# Patient Record
Sex: MALE | Race: WHITE | HISPANIC OR LATINO | Employment: FULL TIME | URBAN - METROPOLITAN AREA
[De-identification: names, ages, dates, MRNs, and addresses within clinical notes are randomized per-mention and may not be internally consistent; named-entity substitution may affect disease eponyms.]

---
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
## 2021-11-24 ENCOUNTER — APPOINTMENT (OUTPATIENT)
Dept: RADIOLOGY | Facility: MEDICAL CENTER | Age: 21
End: 2021-11-24
Attending: EMERGENCY MEDICINE
Payer: MEDICAID

## 2021-11-24 ENCOUNTER — HOSPITAL ENCOUNTER (EMERGENCY)
Facility: MEDICAL CENTER | Age: 21
End: 2021-11-24
Attending: EMERGENCY MEDICINE
Payer: MEDICAID

## 2021-11-24 VITALS
RESPIRATION RATE: 18 BRPM | WEIGHT: 140 LBS | HEIGHT: 65 IN | OXYGEN SATURATION: 95 % | BODY MASS INDEX: 23.32 KG/M2 | DIASTOLIC BLOOD PRESSURE: 93 MMHG | TEMPERATURE: 98.2 F | HEART RATE: 97 BPM | SYSTOLIC BLOOD PRESSURE: 132 MMHG

## 2021-11-24 DIAGNOSIS — S06.0X9A CONCUSSION WITH LOSS OF CONSCIOUSNESS, INITIAL ENCOUNTER: ICD-10-CM

## 2021-11-24 DIAGNOSIS — S20.211A CONTUSION OF RIGHT CHEST WALL, INITIAL ENCOUNTER: ICD-10-CM
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
## 2021-11-24 LAB
ABO GROUP BLD: NORMAL
ALBUMIN SERPL BCP-MCNC: 5.5 G/DL (ref 3.2–4.9)
ALBUMIN/GLOB SERPL: 1.9 G/DL
ALP SERPL-CCNC: 83 U/L (ref 30–99)
ALT SERPL-CCNC: 19 U/L (ref 2–50)
AMPHET UR QL SCN: NEGATIVE
ANION GAP SERPL CALC-SCNC: 14 MMOL/L (ref 7–16)
APTT PPP: 31.5 SEC (ref 24.7–36)
AST SERPL-CCNC: 29 U/L (ref 12–45)
BARBITURATES UR QL SCN: NEGATIVE
BENZODIAZ UR QL SCN: NEGATIVE
BILIRUB SERPL-MCNC: 0.7 MG/DL (ref 0.1–1.5)
BLD GP AB SCN SERPL QL: NORMAL
BUN SERPL-MCNC: 10 MG/DL (ref 8–22)
BZE UR QL SCN: POSITIVE
CALCIUM SERPL-MCNC: 9.6 MG/DL (ref 8.5–10.5)
CANNABINOIDS UR QL SCN: POSITIVE
CHLORIDE SERPL-SCNC: 99 MMOL/L (ref 96–112)
CO2 SERPL-SCNC: 23 MMOL/L (ref 20–33)
CREAT SERPL-MCNC: 0.86 MG/DL (ref 0.5–1.4)
ERYTHROCYTE [DISTWIDTH] IN BLOOD BY AUTOMATED COUNT: 39 FL (ref 35.9–50)
ETHANOL BLD-MCNC: 186.8 MG/DL (ref 0–10)
GLOBULIN SER CALC-MCNC: 2.9 G/DL (ref 1.9–3.5)
GLUCOSE SERPL-MCNC: 110 MG/DL (ref 65–99)
HCT VFR BLD AUTO: 48 % (ref 42–52)
HGB BLD-MCNC: 16 G/DL (ref 14–18)
INR PPP: 1.19 (ref 0.87–1.13)
MCH RBC QN AUTO: 28.2 PG (ref 27–33)
MCHC RBC AUTO-ENTMCNC: 33.3 G/DL (ref 33.7–35.3)
MCV RBC AUTO: 84.5 FL (ref 81.4–97.8)
METHADONE UR QL SCN: NEGATIVE
OPIATES UR QL SCN: NEGATIVE
OXYCODONE UR QL SCN: NEGATIVE
PCP UR QL SCN: NEGATIVE
PLATELET # BLD AUTO: 201 K/UL (ref 164–446)
PMV BLD AUTO: 10.2 FL (ref 9–12.9)
POTASSIUM SERPL-SCNC: 3.1 MMOL/L (ref 3.6–5.5)
PROPOXYPH UR QL SCN: NEGATIVE
PROT SERPL-MCNC: 8.4 G/DL (ref 6–8.2)
PROTHROMBIN TIME: 14.8 SEC (ref 12–14.6)
RBC # BLD AUTO: 5.68 M/UL (ref 4.7–6.1)
RH BLD: NORMAL
SODIUM SERPL-SCNC: 136 MMOL/L (ref 135–145)
WBC # BLD AUTO: 7.7 K/UL (ref 4.8–10.8)

## 2021-11-24 PROCEDURE — 86850 RBC ANTIBODY SCREEN: CPT

## 2021-11-24 PROCEDURE — 82077 ASSAY SPEC XCP UR&BREATH IA: CPT

## 2021-11-24 PROCEDURE — 90715 TDAP VACCINE 7 YRS/> IM: CPT | Performed by: EMERGENCY MEDICINE

## 2021-11-24 PROCEDURE — 99284 EMERGENCY DEPT VISIT MOD MDM: CPT

## 2021-11-24 PROCEDURE — 85610 PROTHROMBIN TIME: CPT

## 2021-11-24 PROCEDURE — 85027 COMPLETE CBC AUTOMATED: CPT

## 2021-11-24 PROCEDURE — 86900 BLOOD TYPING SEROLOGIC ABO: CPT

## 2021-11-24 PROCEDURE — 80053 COMPREHEN METABOLIC PANEL: CPT

## 2021-11-24 PROCEDURE — 303353 HCHG DERMABOND SKIN ADHESIVE

## 2021-11-24 PROCEDURE — 90471 IMMUNIZATION ADMIN: CPT

## 2021-11-24 PROCEDURE — 70450 CT HEAD/BRAIN W/O DYE: CPT

## 2021-11-24 PROCEDURE — 86901 BLOOD TYPING SEROLOGIC RH(D): CPT

## 2021-11-24 PROCEDURE — 85730 THROMBOPLASTIN TIME PARTIAL: CPT

## 2021-11-24 PROCEDURE — 72125 CT NECK SPINE W/O DYE: CPT

## 2021-11-24 PROCEDURE — 305948 HCHG GREEN TRAUMA ACT PRE-NOTIFY NO CC

## 2021-11-24 PROCEDURE — 304217 HCHG IRRIGATION SYSTEM

## 2021-11-24 PROCEDURE — 700111 HCHG RX REV CODE 636 W/ 250 OVERRIDE (IP): Performed by: EMERGENCY MEDICINE

## 2021-11-24 PROCEDURE — 71045 X-RAY EXAM CHEST 1 VIEW: CPT

## 2021-11-24 PROCEDURE — 80307 DRUG TEST PRSMV CHEM ANLYZR: CPT

## 2021-11-24 PROCEDURE — 304999 HCHG REPAIR-SIMPLE/INTERMED LEVEL 1

## 2021-11-24 RX ORDER — KETOROLAC TROMETHAMINE 10 MG/1
10 TABLET, FILM COATED ORAL EVERY 4 HOURS PRN
Qty: 20 TABLET | Refills: 0 | Status: SHIPPED | OUTPATIENT
Start: 2021-11-24

## 2021-11-24 RX ORDER — CYCLOBENZAPRINE HCL 10 MG
10 TABLET ORAL 3 TIMES DAILY PRN
Qty: 30 TABLET | Refills: 0 | Status: SHIPPED | OUTPATIENT
Start: 2021-11-24

## 2021-11-24 RX ADMIN — CLOSTRIDIUM TETANI TOXOID ANTIGEN (FORMALDEHYDE INACTIVATED), CORYNEBACTERIUM DIPHTHERIAE TOXOID ANTIGEN (FORMALDEHYDE INACTIVATED), BORDETELLA PERTUSSIS TOXOID ANTIGEN (GLUTARALDEHYDE INACTIVATED), BORDETELLA PERTUSSIS FILAMENTOUS HEMAGGLUTININ ANTIGEN (FORMALDEHYDE INACTIVATED), BORDETELLA PERTUSSIS PERTACTIN ANTIGEN, AND BORDETELLA PERTUSSIS FIMBRIAE 2/3 ANTIGEN 0.5 ML: 5; 2; 2.5; 5; 3; 5 INJECTION, SUSPENSION INTRAMUSCULAR at 14:54

## 2021-11-24 NOTE — Clinical Note
Basia Lundberg was seen and treated in our emergency department on 11/20/2021.  He may return to work on 11/27/2021.       If you have any questions or concerns, please don't hesitate to call.      Nadir Cardoso M.D.
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
## 2021-11-25 NOTE — DISCHARGE PLANNING
[facility]   Records given to Sriram CLEMENTE with [facility].

## 2021-11-25 NOTE — ED NOTES
IV d/c'd cath intact; no redness, no swelling noted; drsg applied.  D/C home with written and verbal instructions re: Rx, activity, f/u.  Verbalizes understanding.  Ambulated out to family

## 2021-11-26 NOTE — DISCHARGE PLANNING
Pt's first name is spelled wrong (Basia) and he states does not have an 'H'    PAR notified to change.

## 2021-11-26 NOTE — DISCHARGE PLANNING
Call from pt's wife (Saman [phone]) stating the work note from Dr Cardoso states he was here on Nov 20th when, in fact, it was Nov 24th.     When reviewing Epic under 'ED Arrival Information expected date is 11/20/2021 14:27 with arrival time 11/24/2021 13:58'. Discussed with Raul (charge nurse) who states what likely happened is pt came in a Trauma Green and trama chart was probably created on 11/20. There is no way to go back to modify it as it should have been changed when chart was activated.     VM left for Ananadia to discuss. New note written to return to work Dec 1st.       Saman called back with  on phone to verify email address on file and will send secure e-mail to him.    He was told any further work notes will need to come from PCP or he would need to be seen again.

## 2021-11-29 ENCOUNTER — TELEPHONE (OUTPATIENT)
Dept: SCHEDULING | Facility: IMAGING CENTER | Age: 21
End: 2021-11-29

## 2021-12-02 ENCOUNTER — OFFICE VISIT (OUTPATIENT)
Dept: MEDICAL GROUP | Facility: CLINIC | Age: 21
End: 2021-12-02
Payer: MEDICAID

## 2021-12-02 VITALS
DIASTOLIC BLOOD PRESSURE: 85 MMHG | WEIGHT: 118.4 LBS | HEIGHT: 65 IN | SYSTOLIC BLOOD PRESSURE: 128 MMHG | RESPIRATION RATE: 16 BRPM | HEART RATE: 72 BPM | BODY MASS INDEX: 19.73 KG/M2 | OXYGEN SATURATION: 97 % | TEMPERATURE: 98.6 F

## 2021-12-02 DIAGNOSIS — F33.0 MILD EPISODE OF RECURRENT MAJOR DEPRESSIVE DISORDER (HCC): ICD-10-CM
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
## 2021-12-02 DIAGNOSIS — F12.90 MARIJUANA USE: ICD-10-CM

## 2021-12-02 PROCEDURE — 99203 OFFICE O/P NEW LOW 30 MIN: CPT | Performed by: FAMILY MEDICINE

## 2021-12-02 RX ORDER — AMOXICILLIN 500 MG/1
CAPSULE ORAL
COMMUNITY
Start: 2021-09-30

## 2021-12-02 ASSESSMENT — PATIENT HEALTH QUESTIONNAIRE - PHQ9
5. POOR APPETITE OR OVEREATING: 2 - MORE THAN HALF THE DAYS
SUM OF ALL RESPONSES TO PHQ QUESTIONS 1-9: 17
CLINICAL INTERPRETATION OF PHQ2 SCORE: 5

## 2021-12-02 ASSESSMENT — FIBROSIS 4 INDEX: FIB4 SCORE: 0.66

## 2021-12-02 NOTE — PATIENT INSTRUCTIONS
"[facility]: Yohana Maier DO  Address: [address] [place] [address]  Phone: [phone]    Google \"[employer] [facility]\" for free therapy through [employer]  "
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
## 2021-12-07 NOTE — ASSESSMENT & PLAN NOTE
Per chart review, pt tox screen was positive for cannabinoids and cocaine as well as elevated serum alcohol level of 186.  Pt states awareness of these results.  Discussed importance of avoiding driving under the influence of these substances.

## 2021-12-07 NOTE — ASSESSMENT & PLAN NOTE
Patient willing to consider therapy, provided behavioral health resource list and specifically recommended [facility] for local therapy and also discussed that [employer] provides free vidhya-based therapy for [facility] residents.  Pt declines antidepressant therapy today.  He states that his mood has been helped by marijuana use, and he is interested in a medical marijuana card.  He was informed that this practice does not provide these cards, and referred to a local provider who does and can determine if he is eligible for a medical marijuana card.

## 2023-07-05 ENCOUNTER — HOSPITAL ENCOUNTER (EMERGENCY)
Facility: MEDICAL CENTER | Age: 23
End: 2023-07-05
Attending: EMERGENCY MEDICINE
Payer: MEDICAID

## 2023-07-05 ENCOUNTER — APPOINTMENT (OUTPATIENT)
Dept: RADIOLOGY | Facility: MEDICAL CENTER | Age: 23
End: 2023-07-05
Attending: EMERGENCY MEDICINE
Payer: MEDICAID

## 2023-07-05 VITALS
SYSTOLIC BLOOD PRESSURE: 128 MMHG | OXYGEN SATURATION: 100 % | TEMPERATURE: 97.2 F | HEART RATE: 51 BPM | WEIGHT: 121.25 LBS | RESPIRATION RATE: 15 BRPM | DIASTOLIC BLOOD PRESSURE: 74 MMHG | BODY MASS INDEX: 20.2 KG/M2 | HEIGHT: 65 IN

## 2023-07-05 DIAGNOSIS — S61.211A LACERATION OF LEFT INDEX FINGER WITHOUT FOREIGN BODY WITHOUT DAMAGE TO NAIL, INITIAL ENCOUNTER: ICD-10-CM

## 2023-07-05 PROCEDURE — 90715 TDAP VACCINE 7 YRS/> IM: CPT | Performed by: EMERGENCY MEDICINE

## 2023-07-05 PROCEDURE — 700111 HCHG RX REV CODE 636 W/ 250 OVERRIDE (IP): Performed by: EMERGENCY MEDICINE

## 2023-07-05 PROCEDURE — 304217 HCHG IRRIGATION SYSTEM

## 2023-07-05 PROCEDURE — 73140 X-RAY EXAM OF FINGER(S): CPT | Mod: LT

## 2023-07-05 PROCEDURE — 90471 IMMUNIZATION ADMIN: CPT

## 2023-07-05 PROCEDURE — 99283 EMERGENCY DEPT VISIT LOW MDM: CPT

## 2023-07-05 RX ORDER — CEPHALEXIN 500 MG/1
500 CAPSULE ORAL 4 TIMES DAILY
Qty: 20 CAPSULE | Refills: 0 | Status: ACTIVE | OUTPATIENT
Start: 2023-07-05 | End: 2023-07-10

## 2023-07-05 RX ADMIN — CLOSTRIDIUM TETANI TOXOID ANTIGEN (FORMALDEHYDE INACTIVATED), CORYNEBACTERIUM DIPHTHERIAE TOXOID ANTIGEN (FORMALDEHYDE INACTIVATED), BORDETELLA PERTUSSIS TOXOID ANTIGEN (GLUTARALDEHYDE INACTIVATED), BORDETELLA PERTUSSIS FILAMENTOUS HEMAGGLUTININ ANTIGEN (FORMALDEHYDE INACTIVATED), BORDETELLA PERTUSSIS PERTACTIN ANTIGEN, AND BORDETELLA PERTUSSIS FIMBRIAE 2/3 ANTIGEN 0.5 ML: 5; 2; 2.5; 5; 3; 5 INJECTION, SUSPENSION INTRAMUSCULAR at 13:44

## 2023-07-05 ASSESSMENT — FIBROSIS 4 INDEX: FIB4 SCORE: 0.73

## 2023-07-05 NOTE — ED TRIAGE NOTES
Pt amb to triage.  Chief Complaint   Patient presents with    T-5000    Hand Laceration     Left pointer finger vs. glass     Unk last tdap.

## 2023-07-05 NOTE — DISCHARGE INSTRUCTIONS
Keep wound clean, dry and covered.    Return to the ER for any redness, swelling, drainage of pus from the wound, red streaks of the finger, increased pain, fevers over 100.4, shaking chills, or for any concerns.    Soak your finger in Epsom salt several times a day.  This will help with swelling and pain.    Follow-up with Dr. Vigil, orthopedic surgeon, within the next 1 to 2 days.  Please call for appointment.

## 2023-07-05 NOTE — ED PROVIDER NOTES
ER Provider Note    Scribed for Dr. Casie Terrell M.D. by Pako Padilla. 7/5/2023 1:02 PM    Primary Care Provider: Serina Jolly M.D.    CHIEF COMPLAINT  Chief Complaint   Patient presents with    T-5000    Hand Laceration     Left pointer finger vs. glass     EXTERNAL RECORDS REVIEWED  Records were reviewed which showed that the patient was seen by their PCP, EDI Pacheco, in December of 2022 and was seen by Urgent Care in May 2023 for Covid testing.     HPI/ROS  LIMITATION TO HISTORY   None  OUTSIDE HISTORIAN(S):  None    Ken Lundberg is a 23 y.o. male who presents to the ED complaining of a laceration on their left side of his left pointer finger yesterday night at 7 PM. He reports that he was trying to open a glass soda bottle yesterday using a lighter but the top broke into small pieces and cut his finger. He states he removed a shard of glass from the wound that had stabbed into it. He denies any numbness or tingling to the finger. No alleviating factors reported. He is unsure when his last tetanus shot was.     PAST MEDICAL HISTORY  No pertinent past medical history noted.     SURGICAL HISTORY  Past Surgical History:   Procedure Laterality Date    APPENDECTOMY LAPAROSCOPIC  5/25/2015    Procedure: APPENDECTOMY LAPAROSCOPIC;  Surgeon: Nelly Jolly M.D.;  Location: SURGERY [facility];  Service:      FAMILY HISTORY  No pertinent family history noted.     SOCIAL HISTORY   reports that he has never smoked. He has never used smokeless tobacco. He reports current alcohol use. He reports that he does not use drugs.    CURRENT MEDICATIONS  Previous Medications    ACETAMINOPHEN-CODEINE #3 (TYLENOL #3) 300-30 MG TAB        AMOXICILLIN (AMOXIL) 500 MG CAP        CYCLOBENZAPRINE (FLEXERIL) 10 MG TAB    Take 1 Tablet by mouth 3 times a day as needed.    KETOROLAC (TORADOL) 10 MG TAB    Take 1 Tablet by mouth every four hours as needed.     ALLERGIES  Patient has no known allergies.    PHYSICAL EXAM  /88  "  Pulse 61   Temp 36.2 °C (97.1 °F) (Temporal)   Resp 16   Ht 1.651 m (5' 5\")   Wt 55 kg (121 lb 4.1 oz)   SpO2 99%        Constitutional: Alert in no apparent distress.  HENT: Normocephalic, Bilateral external ears normal. Nose normal.   Eyes: Pupils are equal and reactive. Conjunctiva normal, non-icteric.   Thorax & Lungs: Easy unlabored respirations  Skin: Visualized skin is  Dry, No erythema, No rash.   Extremities: 1 cm linear laceration running parallel to the finger along the ulnar aspect of the palmar side, of the distal phalanx, of the left second digit, which appears to already be healing. No signs of redness or purulence. Able to flex and extend at DIP, PIP, and MCP joint without difficulty. Good strength against resistance at all joints. Sensation intact to light touch. Good cap refill.   Neurologic: Alert, clear speech  Psychiatric: Affect and Mood normal    DIAGNOSTIC STUDIES    Radiology:   The attending emergency physician has independently interpreted the diagnostic imaging associated with this visit and am waiting the final reading from the radiologist.     Preliminary independent interpretation: ER MD is reviewed the patient's x-ray.  No obvious foreign body.      Radiologist interpretation:   DX-FINGER(S) 2+ LEFT   Final Result      No radiographic evidence of acute traumatic injury or radiodense foreign body.          COURSE & MEDICAL DECISION MAKING     ED Observation Status? No; Patient does not meet criteria for ED Observation.     INITIAL ASSESSMENT AND PLAN    Care Narrative: Patient presents to the ER with complaint of a finger laceration to the left second digit.  He said he was trying to open a glass soda bottle yesterday with a lighter and the top of the bottle broke.  A piece of glass went into his finger.  He said he pulled out the piece of glass.  He thinks the glass measured about 1-1/2 cm by half a centimeter.  He believes he pulled out the entire piece of glass in its " entirety.  X-ray was obtained and there is no residual glass seen on x-ray.  Patient sustained the wound at 7 PM last night.  At this time its almost 24 hours old and is too old to sew up.  Will need to heal by secondary intention, especially since is a high risk of infection given the puncture like quality of the injury.  Patient will be placed on 5 days of Keflex.  He has good tendon function.  No evidence of tendon laceration or compromise.  He is neurovascular intact.  At this time no signs of infection.  He will be referred to the [facility]/hand surgeon on-call today.  Patient's tetanus was updated.  He has been given instructions on wound care.  He has been given strict return precautions and discharge instructions and understands treatment plan and follow-up.    1:02 PM - Patient was seen and evaluated at bedside. I discussed concerns for remaining glass with the patient as well as informed the patient that since their wound has been open for longer than 8 hours it would need to remain open to heal. Patient was given an opportunity to ask questions. Patient will be treated with Adacel injection. Patient verbalizes understanding and agreement to this plan of care.     ADDITIONAL PROBLEM LIST AND DISPOSITION    Problem #1: Left index finger laceration    I have discussed management of the patient with the following physicians and CM's: none    Discussion of management with other QHP or appropriate source(s): None     Escalation of care considered, and ultimately not performed: blood analysis.  No signs of infection and therefore no blood work needed.    Barriers to care at this time, including but not limited to:  None known .     Decision tools and prescription drugs considered including, but not limited to: Antibiotics patient will go home with 5 days of Keflex as prophylactic antibiotics for a puncture wound which has been open for nearly 24 hours. .    The patient will return for new or  worsening symptoms and is stable at the time of discharge.    The patient is referred to a primary physician for blood pressure management, diabetic screening, and for all other preventative health concerns.    DISPOSITION:  Patient will be discharged home in stable condition.    FOLLOW UP:  Lalo Vigil M.D.  [address] [place] [address]  [phone]    Schedule an appointment as soon as possible for a visit in 2 days  If symptoms worsen return to ER    OUTPATIENT MEDICATIONS:  New Prescriptions    CEPHALEXIN (KEFLEX) 500 MG CAP    Take 1 Capsule by mouth 4 times a day for 5 days.     FINAL DIAGNOSIS  1. Laceration of left index finger without foreign body without damage to nail, initial encounter Acute     The note accurately reflects work and decisions made by me.  Casie Terrell M.D.  7/5/2023  1:47 PM    This dictation has been created using voice recognition software. The accuracy of the dictation is limited by the abilities of the software. I expect there may be some errors of grammar and possibly content. I made every attempt to manually correct the errors within my dictation. However, errors related to voice recognition software may still exist and should be interpreted within the appropriate context.     Pako CANCHOLA (Lisa), am scribing for, and in the presence of, Casie Terrell M.D..    Electronically signed by: Pako Padilla (Lisa), 7/5/2023    Casie CANCHOLA M.D. personally performed the services described in this documentation, as scribed by Pako Padilla in my presence, and it is both accurate and complete.

## 2024-02-01 ENCOUNTER — OFFICE VISIT (OUTPATIENT)
Dept: URGENT CARE | Facility: CLINIC | Age: 24
End: 2024-02-01
Payer: MEDICAID

## 2024-02-01 VITALS
DIASTOLIC BLOOD PRESSURE: 74 MMHG | RESPIRATION RATE: 18 BRPM | BODY MASS INDEX: 22.98 KG/M2 | HEART RATE: 77 BPM | OXYGEN SATURATION: 98 % | TEMPERATURE: 98 F | HEIGHT: 65 IN | SYSTOLIC BLOOD PRESSURE: 118 MMHG | WEIGHT: 137.9 LBS

## 2024-02-01 DIAGNOSIS — R51.9 NONINTRACTABLE HEADACHE, UNSPECIFIED CHRONICITY PATTERN, UNSPECIFIED HEADACHE TYPE: ICD-10-CM

## 2024-02-01 DIAGNOSIS — R11.0 NAUSEA: ICD-10-CM

## 2024-02-01 PROCEDURE — 3074F SYST BP LT 130 MM HG: CPT | Performed by: PHYSICIAN ASSISTANT

## 2024-02-01 PROCEDURE — 3078F DIAST BP <80 MM HG: CPT | Performed by: PHYSICIAN ASSISTANT

## 2024-02-01 PROCEDURE — 99203 OFFICE O/P NEW LOW 30 MIN: CPT | Performed by: PHYSICIAN ASSISTANT

## 2024-02-01 RX ORDER — ONDANSETRON 4 MG/1
4 TABLET, FILM COATED ORAL EVERY 6 HOURS PRN
Qty: 20 TABLET | Refills: 1 | Status: SHIPPED | OUTPATIENT
Start: 2024-02-01

## 2024-02-01 ASSESSMENT — ENCOUNTER SYMPTOMS
DIARRHEA: 0
ABDOMINAL PAIN: 0
VOMITING: 0
CHILLS: 0
FEVER: 0
SORE THROAT: 0
COUGH: 0
NAUSEA: 0

## 2024-02-01 NOTE — PROGRESS NOTES
Subjective:     Ken Lundberg  is a 23 y.o. male who presents for Headache (X1days Nausea/pt needs note)      Other  This is a new problem. The current episode started yesterday. Pertinent negatives include no abdominal pain, chills, coughing, fever, nausea (resolved), sore throat or vomiting. Headaches: resolved.  Patient notes mild symptoms of nausea and headache yesterday at work while at work.  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  He states significant improvement upon leaving last night.  Denies fevers or chills.  Denies cough or sore throat.  Denies ear pain.  Noted mild nausea but did not have an episode of vomiting.  Denied abdominal pain last night or any diarrhea.  Notes complete resolution of symptoms this morning and employer does request a work excuse note.    Review of Systems   Constitutional:  Negative for chills and fever.   HENT:  Negative for ear pain and sore throat.    Respiratory:  Negative for cough.    Gastrointestinal:  Negative for abdominal pain, diarrhea, nausea (resolved) and vomiting.   Neurological:  Headaches: resolved.       Medications:    acetaminophen-codeine #3 Tabs  amoxicillin Caps  cyclobenzaprine Tabs  ketorolac Tabs    Allergies: Patient has no known allergies.    Problem List: Ken Lundberg does not have any pertinent problems on file.    Surgical History:  Past Surgical History:   Procedure Laterality Date    APPENDECTOMY LAPAROSCOPIC  5/25/2015    Procedure: APPENDECTOMY LAPAROSCOPIC;  Surgeon: Nelly Jolly M.D.;  Location: SURGERY [facility];  Service:        Past Social Hx: Ken Lundberg  reports that he has never smoked. He has never used smokeless tobacco. He reports current alcohol use. He reports that he does not use drugs.     Past Family Hx:  Ken Lundberg family history is not on file.     Problem list, medications, and allergies reviewed by myself today in Epic.      "Objective:   /74 (BP Location: Left arm, Patient Position: Sitting)   Pulse 77   Temp 36.7 °C (98 °F) (Temporal)   Resp 18   Ht 1.651 m (5' 5\")   Wt 62.6 kg (137 lb 14.4 oz)   SpO2 98%   BMI 22.95 kg/m²     Physical Exam  Vitals and nursing note reviewed.   Constitutional:       General: He is not in acute distress.     Appearance: Normal appearance. He is well-developed. He is not diaphoretic.   HENT:      Head: Normocephalic and atraumatic.      Right Ear: Tympanic membrane, ear canal and external ear normal.      Left Ear: Tympanic membrane, ear canal and external ear normal.      Nose: Nose normal.      Mouth/Throat:      Mouth: Mucous membranes are moist.      Pharynx: Uvula midline. Posterior oropharyngeal erythema ( mild PND) present. No oropharyngeal exudate.      Tonsils: No tonsillar abscesses.   Eyes:      General: No scleral icterus.        Right eye: No discharge.         Left eye: No discharge.      Conjunctiva/sclera: Conjunctivae normal.   Pulmonary:      Effort: Pulmonary effort is normal. No respiratory distress.      Breath sounds: Normal breath sounds. No stridor. No decreased breath sounds, wheezing, rhonchi or rales.   Musculoskeletal:         General: Normal range of motion.      Cervical back: Neck supple.   Skin:     General: Skin is warm and dry.      Coloration: Skin is not pale.   Neurological:      Mental Status: He is alert and oriented to person, place, and time.      Coordination: Coordination normal.         Assessment/Plan:   Assessment      1. Nonintractable headache, unspecified chronicity pattern, unspecified headache type    2. Nausea  - ondansetron (ZOFRAN) 4 MG Tab tablet; Take 1 Tablet by mouth every 6 hours as needed for Nausea/Vomiting.  Dispense: 20 Tablet; Refill: 1  Supportive care is reviewed with patient/caregiver - recommend to push PO fluids and electrolytes, sent with work excuse note, supportive care reviewed  Return to clinic with lack of resolution " or progression of symptoms.      I have worn an N95 mask, gloves and eye protection for the entire encounter with this patient.     Differential diagnosis, natural history, supportive care, and indications for immediate follow-up discussed.

## 2024-02-01 NOTE — LETTER
MARKIE  [facility] [place] [id number]  [place] [address]     February 1, 2024    Patient: Ken Lundberg   YOB: 2000   Date of Visit: 2/1/2024       To Whom It May Concern:    Ken Lundberg was seen and treated in our department on 2/1/2024.  He should be excused from missed work for yesterday and today.    Sincerely,     Jose Mccann P.A.-C.

## 2024-03-01 ENCOUNTER — APPOINTMENT (OUTPATIENT)
Dept: URGENT CARE | Facility: CLINIC | Age: 24
End: 2024-03-01

## 2024-03-01 ENCOUNTER — OFFICE VISIT (OUTPATIENT)
Dept: URGENT CARE | Facility: CLINIC | Age: 24
End: 2024-03-01
Payer: MEDICAID

## 2024-03-01 VITALS
SYSTOLIC BLOOD PRESSURE: 118 MMHG | HEART RATE: 78 BPM | OXYGEN SATURATION: 97 % | RESPIRATION RATE: 16 BRPM | WEIGHT: 136.6 LBS | HEIGHT: 65 IN | DIASTOLIC BLOOD PRESSURE: 60 MMHG | BODY MASS INDEX: 22.76 KG/M2 | TEMPERATURE: 97.5 F

## 2024-03-01 DIAGNOSIS — J02.9 SORE THROAT: ICD-10-CM

## 2024-03-01 DIAGNOSIS — J00 ACUTE NASOPHARYNGITIS (COMMON COLD): ICD-10-CM

## 2024-03-01 LAB — S PYO DNA SPEC NAA+PROBE: NOT DETECTED

## 2024-03-01 PROCEDURE — 3078F DIAST BP <80 MM HG: CPT | Performed by: NURSE PRACTITIONER

## 2024-03-01 PROCEDURE — 3074F SYST BP LT 130 MM HG: CPT | Performed by: NURSE PRACTITIONER

## 2024-03-01 PROCEDURE — 99213 OFFICE O/P EST LOW 20 MIN: CPT | Performed by: NURSE PRACTITIONER

## 2024-03-01 PROCEDURE — 87651 STREP A DNA AMP PROBE: CPT | Performed by: NURSE PRACTITIONER

## 2024-03-01 ASSESSMENT — ENCOUNTER SYMPTOMS
ORTHOPNEA: 0
COUGH: 1
SHORTNESS OF BREATH: 0
WHEEZING: 0
HEADACHES: 1
DIARRHEA: 0
FEVER: 0
CHILLS: 0
NAUSEA: 0
EYE DISCHARGE: 0
SORE THROAT: 1
MYALGIAS: 1
SPUTUM PRODUCTION: 1

## 2024-03-01 NOTE — LETTER
March 1, 2024        Ken Toño  [address] [place] [address]        Ken was seen in our clinic today and he is excused from work for Wednesday, yesterday and today.  If you have any questions or concerns, please don't hesitate to call.        Sincerely,        TAM Burton.    Electronically Signed

## 2024-03-01 NOTE — PROGRESS NOTES
Subjective     Ken Lundberg is a 24 y.o. male who presents with Cough (X 4 days sore throat/ fever/ vomiting/ needs note for work )            HPI  New problem.  Patient is a 24-year-old male who presents with a 4-day history of nasal congestion, cough, sore throat, nausea, and diarrhea.  He also endorses fever and chills.  He has not had a flu shot this year.  He has been taking over-the-counter combination cough and cold for his symptoms.  He is in need of a work note.    Patient has no known allergies.  Current Outpatient Medications on File Prior to Visit   Medication Sig Dispense Refill    ondansetron (ZOFRAN) 4 MG Tab tablet Take 1 Tablet by mouth every 6 hours as needed for Nausea/Vomiting. (Patient not taking: Reported on 3/1/2024) 20 Tablet 1    amoxicillin (AMOXIL) 500 MG Cap  (Patient not taking: Reported on 12/2/2021)      acetaminophen-codeine #3 (TYLENOL #3) 300-30 MG Tab  (Patient not taking: Reported on 12/2/2021)      ketorolac (TORADOL) 10 MG Tab Take 1 Tablet by mouth every four hours as needed. (Patient not taking: Reported on 12/2/2021) 20 Tablet 0    cyclobenzaprine (FLEXERIL) 10 mg Tab Take 1 Tablet by mouth 3 times a day as needed. (Patient not taking: Reported on 12/2/2021) 30 Tablet 0     No current facility-administered medications on file prior to visit.     Social History     Socioeconomic History    Marital status: Single     Spouse name: Not on file    Number of children: Not on file    Years of education: Not on file    Highest education level: Not on file   Occupational History    Not on file   Tobacco Use    Smoking status: Never    Smokeless tobacco: Never   Vaping Use    Vaping Use: Never used   Substance and Sexual Activity    Alcohol use: Yes     Comment: occ    Drug use: No    Sexual activity: Not on file   Other Topics Concern    Behavioral problems Not Asked    Interpersonal relationships Not Asked    Sad or not enjoying activities Not Asked    Suicidal thoughts Not Asked  "   Poor school performance Not Asked    Reading difficulties Not Asked    Speech difficulties Not Asked    Writing difficulties Not Asked    Inadequate sleep Not Asked    Excessive TV viewing Not Asked    Excessive video game use Not Asked    Inadequate exercise Not Asked    Sports related Not Asked    Poor diet Not Asked    Family concerns for drug/alcohol abuse Not Asked    Poor oral hygiene Not Asked    Bike safety Not Asked    Family concerns vehicle safety Not Asked   Social History Narrative    Not on file     Social Determinants of Health     Financial Resource Strain: Not on file   Food Insecurity: Not on file   Transportation Needs: Not on file   Physical Activity: Not on file   Stress: Not on file   Social Connections: Not on file   Intimate Partner Violence: Not on file   Housing Stability: Not on file     Breast Cancer-related family history is not on file.      Review of Systems   Constitutional:  Positive for malaise/fatigue. Negative for chills and fever.   HENT:  Positive for congestion and sore throat.    Eyes:  Negative for discharge.   Respiratory:  Positive for cough and sputum production. Negative for shortness of breath and wheezing.    Cardiovascular:  Negative for chest pain and orthopnea.   Gastrointestinal:  Negative for diarrhea and nausea.   Musculoskeletal:  Positive for myalgias.   Neurological:  Positive for headaches.   Endo/Heme/Allergies:  Negative for environmental allergies.   All other systems reviewed and are negative.             Objective     /60 (BP Location: Right arm, Patient Position: Sitting, BP Cuff Size: Adult)   Pulse 78   Temp 36.4 °C (97.5 °F) (Temporal)   Resp 16   Ht 1.651 m (5' 5\")   Wt 62 kg (136 lb 9.6 oz)   SpO2 97%   BMI 22.73 kg/m²      Physical Exam  Vitals and nursing note reviewed.   Constitutional:       General: He is not in acute distress.     Appearance: Normal appearance. He is well-developed.   HENT:      Head: Normocephalic and " atraumatic.      Right Ear: Tympanic membrane, ear canal and external ear normal. No middle ear effusion. Tympanic membrane is not injected or perforated.      Left Ear: Tympanic membrane, ear canal and external ear normal.  No middle ear effusion. Tympanic membrane is not injected or perforated.      Nose: Mucosal edema, congestion and rhinorrhea present.      Mouth/Throat:      Pharynx: No oropharyngeal exudate or posterior oropharyngeal erythema.   Eyes:      General:         Right eye: No discharge.         Left eye: No discharge.      Conjunctiva/sclera: Conjunctivae normal.   Cardiovascular:      Rate and Rhythm: Normal rate and regular rhythm.      Heart sounds: Normal heart sounds. No murmur heard.  Pulmonary:      Effort: Pulmonary effort is normal. No respiratory distress.      Breath sounds: Normal breath sounds.   Musculoskeletal:         General: Normal range of motion.      Cervical back: Normal range of motion and neck supple.      Comments: Normal movement of all 4 extremities.   Lymphadenopathy:      Cervical: No cervical adenopathy.      Upper Body:      Right upper body: No supraclavicular adenopathy.      Left upper body: No supraclavicular adenopathy.   Skin:     General: Skin is warm and dry.   Neurological:      Mental Status: He is alert and oriented to person, place, and time.      Gait: Gait normal.   Psychiatric:         Behavior: Behavior normal.         Thought Content: Thought content normal.                             Assessment & Plan        1. Acute nasopharyngitis (common cold)        2. Sore throat  POCT CEPHE[employer] A STREP - PCR        Negative strep. Patient notified of results.  Viral illness at this time with no indication for antibiotics. Reviewed with patient expected course of illness and also reviewed OTC medications that may be used for symptom relief. Follow up 7-10 days if not improving.  Work note for past 3 days.